# Patient Record
Sex: MALE | Race: WHITE
[De-identification: names, ages, dates, MRNs, and addresses within clinical notes are randomized per-mention and may not be internally consistent; named-entity substitution may affect disease eponyms.]

---

## 2018-12-12 ENCOUNTER — RX RENEWAL (OUTPATIENT)
Age: 48
End: 2018-12-12

## 2018-12-12 PROBLEM — Z00.00 ENCOUNTER FOR PREVENTIVE HEALTH EXAMINATION: Status: ACTIVE | Noted: 2018-12-12

## 2019-05-30 ENCOUNTER — CHART COPY (OUTPATIENT)
Age: 49
End: 2019-05-30

## 2019-06-25 ENCOUNTER — RX RENEWAL (OUTPATIENT)
Age: 49
End: 2019-06-25

## 2019-09-24 ENCOUNTER — RX RENEWAL (OUTPATIENT)
Age: 49
End: 2019-09-24

## 2019-10-10 ENCOUNTER — RX RENEWAL (OUTPATIENT)
Age: 49
End: 2019-10-10

## 2019-10-21 ENCOUNTER — APPOINTMENT (OUTPATIENT)
Dept: ENDOCRINOLOGY | Facility: CLINIC | Age: 49
End: 2019-10-21

## 2019-12-11 ENCOUNTER — RX RENEWAL (OUTPATIENT)
Age: 49
End: 2019-12-11

## 2020-02-18 ENCOUNTER — APPOINTMENT (OUTPATIENT)
Dept: ENDOCRINOLOGY | Facility: CLINIC | Age: 50
End: 2020-02-18
Payer: COMMERCIAL

## 2020-02-18 VITALS
DIASTOLIC BLOOD PRESSURE: 72 MMHG | HEIGHT: 72 IN | SYSTOLIC BLOOD PRESSURE: 110 MMHG | BODY MASS INDEX: 31.42 KG/M2 | WEIGHT: 232 LBS | HEART RATE: 55 BPM | OXYGEN SATURATION: 98 %

## 2020-02-18 PROCEDURE — 99214 OFFICE O/P EST MOD 30 MIN: CPT

## 2020-02-18 RX ORDER — CHROMIUM 200 MCG
TABLET ORAL
Refills: 0 | Status: ACTIVE | COMMUNITY

## 2020-02-18 NOTE — REVIEW OF SYSTEMS
[Recent Weight Loss (___ Lbs)] : recent [unfilled] ~Ulb weight loss [Dry Skin] : dry skin [Headache] : headaches [Fatigue] : no fatigue [Recent Weight Gain (___ Lbs)] : no recent weight gain [Decreased Appetite] : appetite not decreased [Visual Field Defect] : no visual field defect [Blurry Vision] : no blurred vision [Dysphonia] : no dysphonia [Dysphagia] : no dysphagia [Neck Pain] : no neck pain [Chest Pain] : no chest pain [Palpitations] : no palpitations [Hair Loss] : no hair loss [Tremors] : no tremors [Depression] : no depression [Anxiety] : no anxiety [Cold Intolerance] : cold tolerant [Heat Intolerance] : heat tolerant [Easy Bruising] : no tendency for easy bruising [Swelling] : no swelling [FreeTextEntry3] : need reading glasses  [de-identified] : yesterday

## 2020-02-18 NOTE — HISTORY OF PRESENT ILLNESS
[FreeTextEntry1] : Has not been seen since 2017. Pt. reports left upper arm numbness at times that comes and goes since the flu vaccine on 2/2/2020. \par \par Quality: Hypothyroidism \par Severity: moderate \par Duration: 7 years ago\par Onset: leg pain \par Modifying Factors: Better with LT4 \par Associated Symptoms: no neck pain, no dysphagia \par \par Notes:\par \par Current Regimen: Levothyroxine 125 mcg daily - taking appropriately\par \par \par Labs reviewed: No recent labs \par \par Date of last thyroid sonogram: 6/15/2017 - Heterogenous gland, Hyperechoic nodule with cystic center 2.6x1.5x1.5cm \par \par Prediabetes:  lifestyle modifications only\par \par On Vitamin D supplement \par

## 2020-02-18 NOTE — REASON FOR VISIT
[Follow-Up: _____] : a [unfilled] follow-up visit [FreeTextEntry1] : Hypothyroidism, Prediabetes, Vitamin D Deficiency, and Thyroid nodule

## 2020-02-18 NOTE — PHYSICAL EXAM
[Alert] : alert [No Acute Distress] : no acute distress [Well Developed] : well developed [Well Nourished] : well nourished [Normal Sclera/Conjunctiva] : normal sclera/conjunctiva [Supple] : the neck was supple [EOMI] : extra ocular movement intact [No LAD] : no lymphadenopathy [Normal Rate and Effort] : normal respiratory rhythm and effort [No Accessory Muscle Use] : no accessory muscle use [Normal Rate] : heart rate was normal  [Clear to Auscultation] : lungs were clear to auscultation bilaterally [Normal S1, S2] : normal S1 and S2 [No Stigmata of Cushings Syndrome] : no stigmata of cushings syndrome [Regular Rhythm] : with a regular rhythm [Normal Gait] : normal gait [No Rash] : no rash [No Motor Deficits] : the motor exam was normal [Oriented x3] : oriented to person, place, and time [No Tremors] : no tremors [Normal Mood] : the mood was normal [Normal Insight/Judgement] : insight and judgment were intact [Acanthosis Nigricans] : no acanthosis nigricans [de-identified] : enlarged right thyroid lobe

## 2020-02-18 NOTE — ASSESSMENT
[FreeTextEntry1] : 51 y/o male with Hypothyroidism, Prediabetes, Vitamin D Deficiency, and Thyroid nodule.\par \par Plan: \par Hypothyroidism: check TFTs now\par - continue LT4 125 mcg daily - may need to be adjusted based on results\par \par Prediabetes: check A1C now\par - continue lifestyle modifications\par \par Thyroid nodule: thyroid sonogram referral given \par \par Vitamin D Deficiency: check levels now, continue vitamin D supplement \par \par Follow up with PCP r/t left upper arm numbness at times s/p flu vaccine. \par \par Follow up visit in 6 months  [Levothyroxine] : The patient was instructed to take Levothyroxine on an empty stomach, separate from vitamins, and wait at least 30 minutes before eating

## 2020-02-24 LAB
25(OH)D3 SERPL-MCNC: 41.3 NG/ML
ALBUMIN SERPL ELPH-MCNC: 4.4 G/DL
ALP BLD-CCNC: 46 U/L
ALT SERPL-CCNC: 25 U/L
ANION GAP SERPL CALC-SCNC: 14 MMOL/L
AST SERPL-CCNC: 31 U/L
BILIRUB SERPL-MCNC: 0.4 MG/DL
BUN SERPL-MCNC: 15 MG/DL
CALCIUM SERPL-MCNC: 9.6 MG/DL
CHLORIDE SERPL-SCNC: 101 MMOL/L
CO2 SERPL-SCNC: 26 MMOL/L
CREAT SERPL-MCNC: 1.31 MG/DL
GLUCOSE SERPL-MCNC: 82 MG/DL
POTASSIUM SERPL-SCNC: 4.4 MMOL/L
PROT SERPL-MCNC: 6.9 G/DL
SODIUM SERPL-SCNC: 140 MMOL/L
T3FREE SERPL-MCNC: 3.03 PG/ML
T4 FREE SERPL-MCNC: 1.2 NG/DL
TSH SERPL-ACNC: 16.7 UIU/ML
VIT B12 SERPL-MCNC: 544 PG/ML

## 2020-03-12 ENCOUNTER — APPOINTMENT (OUTPATIENT)
Dept: ENDOCRINOLOGY | Facility: CLINIC | Age: 50
End: 2020-03-12
Payer: COMMERCIAL

## 2020-03-12 PROCEDURE — 76536 US EXAM OF HEAD AND NECK: CPT

## 2020-03-31 NOTE — IMPRESSION
[FreeTextEntry1] : Diffusely heterogeneous gland with stable complex 2.7 cm complex right lower pole nodule  without significant change since June 2017 study  [FreeTextEntry2] : repeat study in 1 year

## 2020-03-31 NOTE — PROCEDURE
[SwypeShield e 2008 model, 10-12 MHz frequencies] : multiple real time longitudinal and transverse images were obtained using a high resolution ultrasound with a linear transducer, SwypeShield e 2008 model, 10-12 MHz frequencies. All measurements will be reported as longitudinal x cedric-posterior x transverse. [Report dated ___] : Report dated [unfilled] [Thyroid Nodule] : thyroid nodule [] : a heterogeneous parenchyma [FreeTextEntry1] : 5.0 x 2.1 x 1.7 [FreeTextEntry5] : 5.1 x 1.9 x 1.0  [FreeTextEntry2] : 0.3 [FreeTextEntry4] : Right lower pole complex nodule  2.7 x 1.4 cx 1.4 cm

## 2020-05-18 ENCOUNTER — RX RENEWAL (OUTPATIENT)
Age: 50
End: 2020-05-18

## 2020-06-08 ENCOUNTER — RX RENEWAL (OUTPATIENT)
Age: 50
End: 2020-06-08

## 2020-07-10 ENCOUNTER — APPOINTMENT (OUTPATIENT)
Dept: ENDOCRINOLOGY | Facility: CLINIC | Age: 50
End: 2020-07-10
Payer: COMMERCIAL

## 2020-07-10 PROCEDURE — 36415 COLL VENOUS BLD VENIPUNCTURE: CPT

## 2020-07-13 LAB
T3FREE SERPL-MCNC: 3.38 PG/ML
T4 FREE SERPL-MCNC: 1.7 NG/DL
TSH SERPL-ACNC: 0.02 UIU/ML

## 2020-07-13 RX ORDER — LEVOTHYROXINE SODIUM 0.14 MG/1
137 TABLET ORAL
Qty: 96 | Refills: 0 | Status: DISCONTINUED | COMMUNITY
Start: 2018-12-12 | End: 2020-07-13

## 2020-08-11 ENCOUNTER — APPOINTMENT (OUTPATIENT)
Dept: ENDOCRINOLOGY | Facility: CLINIC | Age: 50
End: 2020-08-11

## 2020-08-18 ENCOUNTER — APPOINTMENT (OUTPATIENT)
Dept: ENDOCRINOLOGY | Facility: CLINIC | Age: 50
End: 2020-08-18

## 2020-09-04 ENCOUNTER — APPOINTMENT (OUTPATIENT)
Dept: ENDOCRINOLOGY | Facility: CLINIC | Age: 50
End: 2020-09-04
Payer: COMMERCIAL

## 2020-09-04 PROCEDURE — 99213 OFFICE O/P EST LOW 20 MIN: CPT | Mod: 95

## 2020-09-04 NOTE — REVIEW OF SYSTEMS
[Decreased Appetite] : appetite not decreased [Fatigue] : no fatigue [Recent Weight Loss (___ Lbs)] : recent weight loss: [unfilled] lbs [Visual Field Defect] : no visual field defect [Recent Weight Gain (___ Lbs)] : no recent weight gain [Blurred Vision] : blurred vision [Neck Pain] : no neck pain [Dysphagia] : no dysphagia [Chest Pain] : no chest pain [Dysphonia] : no dysphonia [Hair Loss] : no hair loss [Headaches] : no headaches [Dry Skin] : no dry skin [Palpitations] : no palpitations [Tremors] : no tremors [Cold Intolerance] : no cold intolerance [FreeTextEntry3] : blurry vision with reading  [Heat Intolerance] : no heat intolerance

## 2020-09-04 NOTE — HISTORY OF PRESENT ILLNESS
[Medical Office: ([facility])___] : at the medical office located in  [Other Location: e.g. School (Enter Location, City,State)___] : at [unfilled], at the time of the visit. [Verbal consent obtained from patient] : the patient, [unfilled] [FreeTextEntry1] : Quality: Hypothyroidism \par Severity: moderate \par Duration: 7 years ago\par Onset: leg pain \par Modifying Factors: Better with LT4 \par Associated Symptoms: no neck pain, no dysphagia \par \par Notes:\par \par Current Regimen: Levothyroxine 125 mcg daily - taking appropriately\par \par \par Labs reviewed: 7/13/20 TSH: 0.02, Free T3 3.3, Free T4 1.7\par \par Date of last thyroid sonogram: 6/15/2017 - Heterogenous gland, Hyperechoic nodule with cystic center 2.6x1.5x1.5cm \par \par Prediabetes:  lifestyle modifications only\par \par On Vitamin D supplement \par

## 2020-09-04 NOTE — ASSESSMENT
[FreeTextEntry1] : 49 y/o male with Hypothyroidism, Prediabetes, Vitamin D Deficiency, and Thyroid nodule.\par \par Plan: \par Hypothyroidism: check TFTs now\par - continue LT4 125 mcg daily - may need to be adjusted based on results\par \par Prediabetes: check A1C now\par - continue lifestyle modifications\par \par Thyroid nodule: thyroid nodules unchanged from previous - repeat in 1 year 3/2021\par \par Vitamin D Deficiency: check levels now, continue vitamin D supplement \par \par Follow up visit in 6 months \par \par Start Time: 3:06\par End Time: 3:21 [Levothyroxine] : The patient was instructed to take Levothyroxine on an empty stomach, separate from vitamins, and wait at least 30 minutes before eating

## 2020-10-11 ENCOUNTER — RX RENEWAL (OUTPATIENT)
Age: 50
End: 2020-10-11

## 2020-12-15 ENCOUNTER — RX RENEWAL (OUTPATIENT)
Age: 50
End: 2020-12-15

## 2021-01-29 ENCOUNTER — RX RENEWAL (OUTPATIENT)
Age: 51
End: 2021-01-29

## 2021-03-24 ENCOUNTER — RX RENEWAL (OUTPATIENT)
Age: 51
End: 2021-03-24

## 2021-04-12 ENCOUNTER — RX RENEWAL (OUTPATIENT)
Age: 51
End: 2021-04-12

## 2021-04-17 ENCOUNTER — APPOINTMENT (OUTPATIENT)
Age: 51
End: 2021-04-17

## 2021-05-07 ENCOUNTER — RX RENEWAL (OUTPATIENT)
Age: 51
End: 2021-05-07

## 2021-05-08 ENCOUNTER — APPOINTMENT (OUTPATIENT)
Age: 51
End: 2021-05-08
Payer: COMMERCIAL

## 2021-05-08 PROCEDURE — 0002A: CPT

## 2021-06-16 ENCOUNTER — RX RENEWAL (OUTPATIENT)
Age: 51
End: 2021-06-16

## 2021-06-18 ENCOUNTER — NON-APPOINTMENT (OUTPATIENT)
Age: 51
End: 2021-06-18

## 2021-08-09 ENCOUNTER — NON-APPOINTMENT (OUTPATIENT)
Age: 51
End: 2021-08-09

## 2021-08-17 ENCOUNTER — RX RENEWAL (OUTPATIENT)
Age: 51
End: 2021-08-17

## 2021-10-17 ENCOUNTER — RX RENEWAL (OUTPATIENT)
Age: 51
End: 2021-10-17

## 2021-10-28 LAB
HBA1C MFR BLD HPLC: 5.4
LDLC SERPL CALC-MCNC: 97
MICROALBUMIN/CREAT 24H UR-RTO: NORMAL

## 2021-10-29 ENCOUNTER — APPOINTMENT (OUTPATIENT)
Dept: ENDOCRINOLOGY | Facility: CLINIC | Age: 51
End: 2021-10-29
Payer: COMMERCIAL

## 2021-10-29 VITALS
HEIGHT: 72 IN | WEIGHT: 225 LBS | SYSTOLIC BLOOD PRESSURE: 110 MMHG | BODY MASS INDEX: 30.48 KG/M2 | DIASTOLIC BLOOD PRESSURE: 70 MMHG | HEART RATE: 70 BPM

## 2021-10-29 PROCEDURE — 99214 OFFICE O/P EST MOD 30 MIN: CPT

## 2021-10-29 RX ORDER — FOLIC ACID 1 MG/1
1 TABLET ORAL
Qty: 90 | Refills: 1 | Status: ACTIVE | COMMUNITY
Start: 2021-10-29 | End: 1900-01-01

## 2021-11-02 RX ORDER — METHOTREXATE 2.5 MG/1
2.5 TABLET ORAL
Refills: 0 | Status: ACTIVE | COMMUNITY

## 2021-11-02 NOTE — ASSESSMENT
[Levothyroxine] : The patient was instructed to take Levothyroxine on an empty stomach, separate from vitamins, and wait at least 30 minutes before eating [FreeTextEntry1] : 51 y/o male with Hypothyroidism, Prediabetes, Vitamin D Deficiency, and Thyroid nodule.\par \par Plan: \par Hypothyroidism: check TFTs now\par - continue LT4 125 mcg daily - may need to be adjusted based on results\par \par Prediabetes: check A1C now\par - continue lifestyle modifications\par \par Thyroid nodule: thyroid nodules unchanged from previous - repeat in 1 year 3/2021\par \par Vitamin D Deficiency: check levels now, continue vitamin D supplement \par \par Follow up visit in 6 months \par \par Start Time: 3:06\par End Time: 3:21

## 2021-11-02 NOTE — REVIEW OF SYSTEMS
[Recent Weight Loss (___ Lbs)] : recent weight loss: [unfilled] lbs [Blurred Vision] : blurred vision [Fatigue] : no fatigue [Decreased Appetite] : appetite not decreased [Recent Weight Gain (___ Lbs)] : no recent weight gain [Visual Field Defect] : no visual field defect [Dysphagia] : no dysphagia [Neck Pain] : no neck pain [Dysphonia] : no dysphonia [Chest Pain] : no chest pain [Palpitations] : no palpitations [Dry Skin] : no dry skin [Hair Loss] : no hair loss [Headaches] : no headaches [Tremors] : no tremors [Cold Intolerance] : no cold intolerance [Heat Intolerance] : no heat intolerance [FreeTextEntry3] : blurry vision with reading

## 2021-11-02 NOTE — HISTORY OF PRESENT ILLNESS
[Other Location: e.g. School (Enter Location, City,State)___] : at [unfilled], at the time of the visit. [Medical Office: ([facility])___] : at the medical office located in  [Verbal consent obtained from patient] : the patient, [unfilled] [FreeTextEntry1] : Quality: Hypothyroidism \par Severity: moderate \par Duration: 7 years ago\par Onset: leg pain \par Modifying Factors: Better with LT4 \par Associated Symptoms: no neck pain, no dysphagia \par \par Notes:\par \par Current Regimen: Levothyroxine 125 mcg daily - taking appropriately\par \par \par Labs reviewed: 7/13/20 TSH: 0.02, Free T3 3.3, Free T4 1.7\par \par Date of last thyroid sonogram: 6/15/2017 - Heterogenous gland, Hyperechoic nodule with cystic center 2.6x1.5x1.5cm \par \par Prediabetes:  lifestyle modifications only\par \par On Vitamin D supplement \par \par dx with SLE   on MTX  \par  6 tab od 2.5 mg \par \par

## 2022-04-28 ENCOUNTER — APPOINTMENT (OUTPATIENT)
Dept: ENDOCRINOLOGY | Facility: CLINIC | Age: 52
End: 2022-04-28

## 2022-10-06 ENCOUNTER — APPOINTMENT (OUTPATIENT)
Dept: ENDOCRINOLOGY | Facility: CLINIC | Age: 52
End: 2022-10-06

## 2022-10-06 PROCEDURE — 76536 US EXAM OF HEAD AND NECK: CPT

## 2022-10-24 NOTE — IMPRESSION
[FreeTextEntry1] : Diffusely heterogeneous gland with stable complex 2.6 cm complex right lower pole nodule  without significant change since MArch 2020  study  [FreeTextEntry2] : repeat study in 1 year

## 2022-10-24 NOTE — PROCEDURE
[Pet[employer]rosia e 2008 model, 10-12 MHz frequencies] : multiple real time longitudinal and transverse images were obtained using a high resolution ultrasound with a linear transducer, Petbrosia e 2008 model, 10-12 MHz frequencies. All measurements will be reported as longitudinal x cedric-posterior x transverse. [Report dated ___] : Report dated [unfilled] [Thyroid Nodule] : thyroid nodule [FreeTextEntry1] : 4.6 x1.7 x1.9  [FreeTextEntry5] : 3.3x 1.3 x 1.1 [FreeTextEntry4] : Right lower pole complex nodule  2.6 x 1.6 cm x  1.4 cm  [FreeTextEntry2] : 0.5

## 2022-12-20 ENCOUNTER — APPOINTMENT (OUTPATIENT)
Dept: ENDOCRINOLOGY | Facility: CLINIC | Age: 52
End: 2022-12-20

## 2023-02-13 ENCOUNTER — RX RENEWAL (OUTPATIENT)
Age: 53
End: 2023-02-13

## 2023-02-17 ENCOUNTER — RX RENEWAL (OUTPATIENT)
Age: 53
End: 2023-02-17

## 2023-03-02 ENCOUNTER — APPOINTMENT (OUTPATIENT)
Dept: ENDOCRINOLOGY | Facility: CLINIC | Age: 53
End: 2023-03-02
Payer: COMMERCIAL

## 2023-03-02 VITALS
BODY MASS INDEX: 33.18 KG/M2 | WEIGHT: 245 LBS | RESPIRATION RATE: 14 BRPM | HEART RATE: 80 BPM | SYSTOLIC BLOOD PRESSURE: 100 MMHG | OXYGEN SATURATION: 98 % | HEIGHT: 72 IN | DIASTOLIC BLOOD PRESSURE: 80 MMHG

## 2023-03-02 PROCEDURE — 99214 OFFICE O/P EST MOD 30 MIN: CPT

## 2023-03-02 NOTE — HISTORY OF PRESENT ILLNESS
[FreeTextEntry1] : Quality: Hypothyroidism \par Severity: moderate \par Duration: 7 years ago\par Onset: leg pain \par Modifying Factors: Better with LT4 \par Associated Symptoms: no neck pain, no dysphagia \par \par Notes:\par \par Current Regimen: Levothyroxine 125 mcg daily - taking appropriately\par \par \par Labs reviewed:\par Date of last thyroid sonogram: 10/2022 - Heterogenous gland, Hyperechoic nodule with cystic center 2.6x1.5x1.5cm \par \par Prediabetes:  lifestyle modifications only\par \par On Vitamin D supplement \par dx spondylarthorpathy   wnet off MTX  due to see Rhem\par \par \par a fw weeksa go  nsoebleedin diner \par ? vasovagalrxn- felt woozy and broek into sweat  did not faint \par

## 2023-03-02 NOTE — ASSESSMENT
[Levothyroxine] : The patient was instructed to take Levothyroxine on an empty stomach, separate from vitamins, and wait at least 30 minutes before eating [FreeTextEntry1] : 52 y/o male with Hypothyroidism, Prediabetes, Vitamin D Deficiency, and Thyroid nodule.\par \par Plan: \par Hypothyroidism: check TFTs now\par - continue LT4 125 mcg daily - may need to be adjusted based on results\par \par Prediabetes: check A1C now\par - continue lifestyle modifications\par \par Thyroid nodule: thyroid nodules unchanged from previous - repeat in 1Oc2023\par \par Vitamin D Deficiency: check levels now, continue vitamin D supplement \par \par  nosebleed-0 see ENt some nasla congestion\par \par  Spndylarthropahty see Rhem\par \par  follow up with heme  inc ferritin

## 2023-09-07 ENCOUNTER — APPOINTMENT (OUTPATIENT)
Dept: ENDOCRINOLOGY | Facility: CLINIC | Age: 53
End: 2023-09-07
Payer: COMMERCIAL

## 2023-09-07 ENCOUNTER — RX RENEWAL (OUTPATIENT)
Age: 53
End: 2023-09-07

## 2023-09-07 VITALS
DIASTOLIC BLOOD PRESSURE: 74 MMHG | HEART RATE: 57 BPM | BODY MASS INDEX: 32.64 KG/M2 | HEIGHT: 72 IN | WEIGHT: 241 LBS | OXYGEN SATURATION: 97 % | SYSTOLIC BLOOD PRESSURE: 110 MMHG

## 2023-09-07 DIAGNOSIS — R73.03 PREDIABETES.: ICD-10-CM

## 2023-09-07 DIAGNOSIS — E53.8 DEFICIENCY OF OTHER SPECIFIED B GROUP VITAMINS: ICD-10-CM

## 2023-09-07 DIAGNOSIS — E55.9 VITAMIN D DEFICIENCY, UNSPECIFIED: ICD-10-CM

## 2023-09-07 DIAGNOSIS — E04.1 NONTOXIC SINGLE THYROID NODULE: ICD-10-CM

## 2023-09-07 DIAGNOSIS — E03.8 OTHER SPECIFIED HYPOTHYROIDISM: ICD-10-CM

## 2023-09-07 PROCEDURE — 76536 US EXAM OF HEAD AND NECK: CPT

## 2023-09-07 PROCEDURE — 99215 OFFICE O/P EST HI 40 MIN: CPT

## 2023-09-07 PROCEDURE — 36415 COLL VENOUS BLD VENIPUNCTURE: CPT

## 2023-09-07 NOTE — PHYSICAL EXAM
[Alert] : alert [Well Nourished] : well nourished [No Acute Distress] : no acute distress [Well Developed] : well developed [Normal Sclera/Conjunctiva] : normal sclera/conjunctiva [EOMI] : extra ocular movement intact [No Proptosis] : no proptosis [Normal Oropharynx] : the oropharynx was normal [Thyroid Not Enlarged] : the thyroid was not enlarged [No Respiratory Distress] : no respiratory distress [No Accessory Muscle Use] : no accessory muscle use [Clear to Auscultation] : lungs were clear to auscultation bilaterally [Normal S1, S2] : normal S1 and S2 [Normal Rate] : heart rate was normal [Regular Rhythm] : with a regular rhythm [Normal Bowel Sounds] : normal bowel sounds [Not Tender] : non-tender [Not Distended] : not distended [Soft] : abdomen soft [No Rash] : no rash [Normal Reflexes] : deep tendon reflexes were 2+ and symmetric [No Tremors] : no tremors [Oriented x3] : oriented to person, place, and time [Acanthosis Nigricans] : no acanthosis nigricans [de-identified] : palpable R nodule

## 2023-09-07 NOTE — HISTORY OF PRESENT ILLNESS
[FreeTextEntry1] : here for hypothyroidism, prediabetes, mng, vit d def  hypothyroidism dx 2015   interval history saw dr. waters in past  chart reviewed had labs done today had nodule biopsied about 10 years   taking LT4 125mcg daily, occ missing doses

## 2023-09-07 NOTE — ASSESSMENT
[FreeTextEntry1] : 53M w/ spondylarthropathy, hypothyroidism, R thyroid nodule, prediabetes and vit d deficiency here for follow up rtc in 1 year with labs and in office sono might be moving to [place] in 8 years  Hypothyroidism- need labs. no symptoms. continue LT4 125mcg  Thyroid nodule- in setting of hashimoto's gland. RMP isoechoic nodule 2.6cm.sono, now mostly solid. previously when nodule was biopsied it likely had more cystic component. 2010 RMP 3.7cm s/p benign biopsy. does not need rebiopsy unless it has changed in size/appearance or he has symptoms. discussed with the patient. repeat sono annually  Prediabetes- work on diet and exercise. check a1c annually  Vit D deficiency- continue vit d supplement

## 2023-09-11 PROBLEM — E04.1 THYROID NODULE: Status: ACTIVE | Noted: 2020-02-18

## 2023-10-08 LAB
25(OH)D3 SERPL-MCNC: 36.3 NG/ML
ALBUMIN SERPL ELPH-MCNC: 4.7 G/DL
ALP BLD-CCNC: 47 U/L
ALT SERPL-CCNC: 20 U/L
ANION GAP SERPL CALC-SCNC: 10 MMOL/L
AST SERPL-CCNC: 18 U/L
BILIRUB SERPL-MCNC: 0.4 MG/DL
BUN SERPL-MCNC: 16 MG/DL
CALCIUM SERPL-MCNC: 10 MG/DL
CHLORIDE SERPL-SCNC: 104 MMOL/L
CHOLEST SERPL-MCNC: 219 MG/DL
CO2 SERPL-SCNC: 27 MMOL/L
CREAT SERPL-MCNC: 1.28 MG/DL
CREAT SPEC-SCNC: 160 MG/DL
EGFR: 67 ML/MIN/1.73M2
ESTIMATED AVERAGE GLUCOSE: 117 MG/DL
FERRITIN SERPL-MCNC: 335 NG/ML
GLUCOSE SERPL-MCNC: 93 MG/DL
HBA1C MFR BLD HPLC: 5.7 %
HDLC SERPL-MCNC: 64 MG/DL
IRON SATN MFR SERPL: 32 %
IRON SERPL-MCNC: 114 UG/DL
LDLC SERPL CALC-MCNC: 137 MG/DL
MICROALBUMIN 24H UR DL<=1MG/L-MCNC: <1.2 MG/DL
MICROALBUMIN/CREAT 24H UR-RTO: NORMAL MG/G
NONHDLC SERPL-MCNC: 155 MG/DL
POTASSIUM SERPL-SCNC: 4.9 MMOL/L
PROT SERPL-MCNC: 7.1 G/DL
SODIUM SERPL-SCNC: 141 MMOL/L
T3FREE SERPL-MCNC: 3.37 PG/ML
T4 FREE SERPL-MCNC: 1.5 NG/DL
TIBC SERPL-MCNC: 358 UG/DL
TRIGL SERPL-MCNC: 102 MG/DL
TSH SERPL-ACNC: 1.81 UIU/ML
UIBC SERPL-MCNC: 244 UG/DL
VIT B12 SERPL-MCNC: 538 PG/ML

## 2024-03-15 ENCOUNTER — RX RENEWAL (OUTPATIENT)
Age: 54
End: 2024-03-15

## 2024-06-20 ENCOUNTER — RX RENEWAL (OUTPATIENT)
Age: 54
End: 2024-06-20

## 2024-06-20 RX ORDER — LEVOTHYROXINE SODIUM 0.12 MG/1
125 TABLET ORAL
Qty: 90 | Refills: 0 | Status: ACTIVE | COMMUNITY
Start: 2020-06-08 | End: 1900-01-01

## 2024-08-29 ENCOUNTER — APPOINTMENT (OUTPATIENT)
Dept: ENDOCRINOLOGY | Facility: CLINIC | Age: 54
End: 2024-08-29

## 2024-08-29 DIAGNOSIS — E04.1 NONTOXIC SINGLE THYROID NODULE: ICD-10-CM

## 2024-08-29 PROCEDURE — 76536 US EXAM OF HEAD AND NECK: CPT

## 2024-09-07 NOTE — PROCEDURE
[[employer] e 2008 model, 10-12 MHz frequencies] : multiple real time longitudinal and transverse images were obtained using a high resolution ultrasound with a linear transducer, [employer] e 2008 model, 10-12 MHz frequencies. All measurements will be reported as longitudinal x cedric-posterior x transverse. [Report dated ___] : Report dated [unfilled] [Thyroid Nodule] : thyroid nodule [] : a heterogeneous parenchyma [Predominantly Solid] : predominantly solid [Isoechoic solid component] : with isoechoic solid component [Macrocalcifications] : macrocalcifications [Peripheral vascularity] : peripheral vascularity [Right Thyroid] : right [Comet trail artifact] : comet trail artifact [Mid] : mid pole there is a  [Left Thyroid] : left [Lower] : lower pole there is a  [Mixed] : mixed [Hypoechoic solid component] : with hypoechoic solid component [Round] : round in shape [Isthmus] : isthmus there is a  [Solid] : solid [Hypoechoic] : hypoechoic nodule [Spongiform Appearance] : spongiform appearance [Ovoid] : ovoid in shape [Smooth] : smooth [Regular] : regular [No calcifications] : no calcifications [FreeTextEntry1] : 3.8x1.6x1.7 [FreeTextEntry5] : 3.9x0.9x1.2 [FreeTextEntry2] : 0.4 [FreeTextEntry4] : LLP nodule with more of a cystic component (previously did not have a cystic component). [FreeTextEntry3] : 0.7x0.5x0.4

## 2024-09-07 NOTE — IMPRESSION
[FreeTextEntry1] : Heterogeneous thyroid Single large thyroid nodule with previous benign biopsy, stable in size [FreeTextEntry2] : Repeat sonogram in 1 year

## 2024-09-12 ENCOUNTER — APPOINTMENT (OUTPATIENT)
Dept: ENDOCRINOLOGY | Facility: CLINIC | Age: 54
End: 2024-09-12
Payer: COMMERCIAL

## 2024-09-12 VITALS
WEIGHT: 238 LBS | DIASTOLIC BLOOD PRESSURE: 70 MMHG | HEART RATE: 67 BPM | SYSTOLIC BLOOD PRESSURE: 110 MMHG | OXYGEN SATURATION: 97 % | BODY MASS INDEX: 32.23 KG/M2 | HEIGHT: 72 IN

## 2024-09-12 DIAGNOSIS — E53.8 DEFICIENCY OF OTHER SPECIFIED B GROUP VITAMINS: ICD-10-CM

## 2024-09-12 DIAGNOSIS — R73.03 PREDIABETES.: ICD-10-CM

## 2024-09-12 DIAGNOSIS — E04.1 NONTOXIC SINGLE THYROID NODULE: ICD-10-CM

## 2024-09-12 DIAGNOSIS — E55.9 VITAMIN D DEFICIENCY, UNSPECIFIED: ICD-10-CM

## 2024-09-12 DIAGNOSIS — E03.8 OTHER SPECIFIED HYPOTHYROIDISM: ICD-10-CM

## 2024-09-12 PROCEDURE — 99214 OFFICE O/P EST MOD 30 MIN: CPT

## 2024-09-12 PROCEDURE — 36415 COLL VENOUS BLD VENIPUNCTURE: CPT

## 2024-09-12 NOTE — HISTORY OF PRESENT ILLNESS
[FreeTextEntry1] : here for hypothyroidism, prediabetes, mng, vit d def had nodule biopsied about 10 years ago  hypothyroidism dx 2015   interval history here for annual visit chart reviewed no labs done wife works as a teacher. has 3 boys works as construction  had labs done today areli in office reviewed- stable nodules  taking LT4 125mcg daily, occ missing doses

## 2024-09-12 NOTE — PHYSICAL EXAM
[Alert] : alert [Well Nourished] : well nourished [No Acute Distress] : no acute distress [Well Developed] : well developed [Normal Sclera/Conjunctiva] : normal sclera/conjunctiva [EOMI] : extra ocular movement intact [No Proptosis] : no proptosis [Normal Oropharynx] : the oropharynx was normal [Thyroid Not Enlarged] : the thyroid was not enlarged [No Respiratory Distress] : no respiratory distress [No Accessory Muscle Use] : no accessory muscle use [Clear to Auscultation] : lungs were clear to auscultation bilaterally [Normal S1, S2] : normal S1 and S2 [Normal Rate] : heart rate was normal [Regular Rhythm] : with a regular rhythm [Normal Bowel Sounds] : normal bowel sounds [Not Tender] : non-tender [Not Distended] : not distended [Soft] : abdomen soft [No Rash] : no rash [Normal Reflexes] : deep tendon reflexes were 2+ and symmetric [No Tremors] : no tremors [Oriented x3] : oriented to person, place, and time [Acanthosis Nigricans] : no acanthosis nigricans [de-identified] : palpable R nodule

## 2024-09-12 NOTE — ASSESSMENT
[FreeTextEntry1] : 54M w/ spondyloarthropathy with chronic arthritis, hypothyroidism, R thyroid nodule, prediabetes and vit d deficiency here for follow up rtc in 1 year with labs and sono labs done in the office today might be moving to [place] in 5 years  Hypothyroidism- need labs. no symptoms. continue LT4 125mcg  Thyroid nodule- in setting of hashimoto's gland. RMP isoechoic nodule 2.6cm.sono, now mostly solid. previously when nodule was biopsied it likely had more cystic component. 2010 RMP 3.7cm s/p benign biopsy. does not need rebiopsy unless it has changed in size/appearance or he has symptoms. discussed with the patient.  2024 sono with stable nodule repeat sono annually  Prediabetes- work on diet and exercise. check a1c annually  Vit D deficiency- continue vit d supplement

## 2024-09-13 LAB
25(OH)D3 SERPL-MCNC: 40.5 NG/ML
ALBUMIN SERPL ELPH-MCNC: 4.7 G/DL
ALP BLD-CCNC: 53 U/L
ALT SERPL-CCNC: 23 U/L
ANION GAP SERPL CALC-SCNC: 10 MMOL/L
AST SERPL-CCNC: 22 U/L
BASOPHILS # BLD AUTO: 0.08 K/UL
BASOPHILS NFR BLD AUTO: 1.3 %
BILIRUB SERPL-MCNC: 0.4 MG/DL
BUN SERPL-MCNC: 14 MG/DL
CALCIUM SERPL-MCNC: 10.1 MG/DL
CHLORIDE SERPL-SCNC: 102 MMOL/L
CHOLEST SERPL-MCNC: 227 MG/DL
CO2 SERPL-SCNC: 27 MMOL/L
CREAT SERPL-MCNC: 1.02 MG/DL
EGFR: 87 ML/MIN/1.73M2
EOSINOPHIL # BLD AUTO: 0.19 K/UL
EOSINOPHIL NFR BLD AUTO: 3 %
ESTIMATED AVERAGE GLUCOSE: 114 MG/DL
GLUCOSE SERPL-MCNC: 89 MG/DL
HBA1C MFR BLD HPLC: 5.6 %
HCT VFR BLD CALC: 47.1 %
HDLC SERPL-MCNC: 55 MG/DL
HGB BLD-MCNC: 15.7 G/DL
IMM GRANULOCYTES NFR BLD AUTO: 0.3 %
LDLC SERPL CALC-MCNC: 143 MG/DL
LYMPHOCYTES # BLD AUTO: 2.07 K/UL
LYMPHOCYTES NFR BLD AUTO: 32.4 %
MAN DIFF?: NORMAL
MCHC RBC-ENTMCNC: 31.4 PG
MCHC RBC-ENTMCNC: 33.3 GM/DL
MCV RBC AUTO: 94.2 FL
MONOCYTES # BLD AUTO: 0.66 K/UL
MONOCYTES NFR BLD AUTO: 10.3 %
NEUTROPHILS # BLD AUTO: 3.37 K/UL
NEUTROPHILS NFR BLD AUTO: 52.7 %
NONHDLC SERPL-MCNC: 172 MG/DL
PLATELET # BLD AUTO: 277 K/UL
POTASSIUM SERPL-SCNC: 4.5 MMOL/L
PROT SERPL-MCNC: 7.2 G/DL
RBC # BLD: 5 M/UL
RBC # FLD: 13.2 %
SODIUM SERPL-SCNC: 138 MMOL/L
T4 FREE SERPL-MCNC: 1.6 NG/DL
TRIGL SERPL-MCNC: 161 MG/DL
TSH SERPL-ACNC: 0.83 UIU/ML
VIT B12 SERPL-MCNC: 513 PG/ML
WBC # FLD AUTO: 6.39 K/UL

## 2025-03-27 ENCOUNTER — APPOINTMENT (OUTPATIENT)
Dept: ENDOCRINOLOGY | Facility: CLINIC | Age: 55
End: 2025-03-27
Payer: COMMERCIAL

## 2025-03-27 VITALS
BODY MASS INDEX: 31.83 KG/M2 | DIASTOLIC BLOOD PRESSURE: 70 MMHG | HEIGHT: 72 IN | OXYGEN SATURATION: 97 % | WEIGHT: 235 LBS | SYSTOLIC BLOOD PRESSURE: 112 MMHG | HEART RATE: 63 BPM

## 2025-03-27 DIAGNOSIS — R73.03 PREDIABETES.: ICD-10-CM

## 2025-03-27 DIAGNOSIS — M54.9 DORSALGIA, UNSPECIFIED: ICD-10-CM

## 2025-03-27 DIAGNOSIS — E55.9 VITAMIN D DEFICIENCY, UNSPECIFIED: ICD-10-CM

## 2025-03-27 DIAGNOSIS — E04.1 NONTOXIC SINGLE THYROID NODULE: ICD-10-CM

## 2025-03-27 DIAGNOSIS — E03.8 OTHER SPECIFIED HYPOTHYROIDISM: ICD-10-CM

## 2025-03-27 PROCEDURE — 99215 OFFICE O/P EST HI 40 MIN: CPT

## 2025-03-27 PROCEDURE — G0447 BEHAVIOR COUNSEL OBESITY 15M: CPT | Mod: 59

## 2025-03-27 PROCEDURE — G2211 COMPLEX E/M VISIT ADD ON: CPT | Mod: NC

## 2025-03-27 RX ORDER — SEMAGLUTIDE 0.25 MG/.5ML
0.25 INJECTION, SOLUTION SUBCUTANEOUS
Qty: 1 | Refills: 0 | Status: ACTIVE | COMMUNITY
Start: 2025-03-27

## 2025-03-28 LAB
25(OH)D3 SERPL-MCNC: 29.5 NG/ML
ALBUMIN SERPL ELPH-MCNC: 4.7 G/DL
ALP BLD-CCNC: 60 U/L
ALT SERPL-CCNC: 30 U/L
ANION GAP SERPL CALC-SCNC: 12 MMOL/L
AST SERPL-CCNC: 22 U/L
BILIRUB SERPL-MCNC: 0.3 MG/DL
BUN SERPL-MCNC: 15 MG/DL
CALCIUM SERPL-MCNC: 9.6 MG/DL
CHLORIDE SERPL-SCNC: 102 MMOL/L
CHOLEST SERPL-MCNC: 252 MG/DL
CO2 SERPL-SCNC: 26 MMOL/L
CREAT SERPL-MCNC: 1.21 MG/DL
EGFRCR SERPLBLD CKD-EPI 2021: 70 ML/MIN/1.73M2
ESTIMATED AVERAGE GLUCOSE: 117 MG/DL
GLUCOSE SERPL-MCNC: 95 MG/DL
HBA1C MFR BLD HPLC: 5.7 %
HDLC SERPL-MCNC: 59 MG/DL
LDLC SERPL-MCNC: 176 MG/DL
NONHDLC SERPL-MCNC: 193 MG/DL
POTASSIUM SERPL-SCNC: 4.5 MMOL/L
PROT SERPL-MCNC: 7.4 G/DL
SODIUM SERPL-SCNC: 139 MMOL/L
T4 FREE SERPL-MCNC: 0.9 NG/DL
TRIGL SERPL-MCNC: 101 MG/DL
TSH SERPL-ACNC: 11.9 UIU/ML

## 2025-04-01 ENCOUNTER — APPOINTMENT (OUTPATIENT)
Dept: CARDIOLOGY | Facility: CLINIC | Age: 55
End: 2025-04-01
Payer: COMMERCIAL

## 2025-04-01 VITALS
HEIGHT: 72 IN | WEIGHT: 235 LBS | OXYGEN SATURATION: 97 % | DIASTOLIC BLOOD PRESSURE: 80 MMHG | BODY MASS INDEX: 31.83 KG/M2 | HEART RATE: 71 BPM | SYSTOLIC BLOOD PRESSURE: 130 MMHG

## 2025-04-01 DIAGNOSIS — Z78.9 OTHER SPECIFIED HEALTH STATUS: ICD-10-CM

## 2025-04-01 DIAGNOSIS — E66.9 OBESITY, UNSPECIFIED: ICD-10-CM

## 2025-04-01 DIAGNOSIS — Z00.00 ENCOUNTER FOR GENERAL ADULT MEDICAL EXAMINATION W/OUT ABNORMAL FINDINGS: ICD-10-CM

## 2025-04-01 DIAGNOSIS — Z87.891 PERSONAL HISTORY OF NICOTINE DEPENDENCE: ICD-10-CM

## 2025-04-01 DIAGNOSIS — Z86.19 PERSONAL HISTORY OF OTHER INFECTIOUS AND PARASITIC DISEASES: ICD-10-CM

## 2025-04-01 DIAGNOSIS — R06.02 SHORTNESS OF BREATH: ICD-10-CM

## 2025-04-01 DIAGNOSIS — M47.819 SPONDYLOSIS W/OUT MYELOPATHY OR RADICULOPATHY, SITE UNSPECIFIED: ICD-10-CM

## 2025-04-01 DIAGNOSIS — E78.5 HYPERLIPIDEMIA, UNSPECIFIED: ICD-10-CM

## 2025-04-01 DIAGNOSIS — R53.83 OTHER FATIGUE: ICD-10-CM

## 2025-04-01 PROCEDURE — 99203 OFFICE O/P NEW LOW 30 MIN: CPT

## 2025-04-01 PROCEDURE — 93000 ELECTROCARDIOGRAM COMPLETE: CPT

## 2025-04-01 RX ORDER — ROSUVASTATIN CALCIUM 10 MG/1
10 TABLET, FILM COATED ORAL
Qty: 90 | Refills: 3 | Status: ACTIVE | COMMUNITY
Start: 2025-04-01 | End: 1900-01-01

## 2025-04-24 ENCOUNTER — NON-APPOINTMENT (OUTPATIENT)
Age: 55
End: 2025-04-24

## 2025-06-09 ENCOUNTER — APPOINTMENT (OUTPATIENT)
Dept: CARDIOLOGY | Facility: CLINIC | Age: 55
End: 2025-06-09
Payer: COMMERCIAL

## 2025-06-09 VITALS
DIASTOLIC BLOOD PRESSURE: 80 MMHG | BODY MASS INDEX: 31.83 KG/M2 | WEIGHT: 235 LBS | SYSTOLIC BLOOD PRESSURE: 128 MMHG | HEART RATE: 69 BPM | HEIGHT: 72 IN | OXYGEN SATURATION: 97 %

## 2025-06-09 PROCEDURE — 99213 OFFICE O/P EST LOW 20 MIN: CPT

## 2025-06-20 LAB
LDLC SERPL DIRECT ASSAY-MCNC: 72
TSH SERPL-ACNC: 2.4

## 2025-06-23 ENCOUNTER — NON-APPOINTMENT (OUTPATIENT)
Age: 55
End: 2025-06-23

## 2025-06-23 ENCOUNTER — APPOINTMENT (OUTPATIENT)
Dept: ENDOCRINOLOGY | Facility: CLINIC | Age: 55
End: 2025-06-23
Payer: COMMERCIAL

## 2025-06-23 VITALS
BODY MASS INDEX: 31.15 KG/M2 | HEIGHT: 72 IN | WEIGHT: 230 LBS | OXYGEN SATURATION: 94 % | SYSTOLIC BLOOD PRESSURE: 126 MMHG | DIASTOLIC BLOOD PRESSURE: 70 MMHG | HEART RATE: 88 BPM

## 2025-06-23 PROCEDURE — 99214 OFFICE O/P EST MOD 30 MIN: CPT

## 2025-06-23 PROCEDURE — G2211 COMPLEX E/M VISIT ADD ON: CPT | Mod: NC

## 2025-09-10 ENCOUNTER — APPOINTMENT (OUTPATIENT)
Dept: ENDOCRINOLOGY | Facility: CLINIC | Age: 55
End: 2025-09-10

## 2025-09-18 ENCOUNTER — NON-APPOINTMENT (OUTPATIENT)
Age: 55
End: 2025-09-18

## 2025-09-18 LAB
HBA1C MFR BLD HPLC: 5.5
LDLC SERPL DIRECT ASSAY-MCNC: 144
TSH SERPL-ACNC: 0.84

## 2025-09-19 ENCOUNTER — APPOINTMENT (OUTPATIENT)
Dept: ENDOCRINOLOGY | Facility: CLINIC | Age: 55
End: 2025-09-19

## 2025-09-19 VITALS
BODY MASS INDEX: 30.75 KG/M2 | WEIGHT: 227 LBS | HEART RATE: 70 BPM | HEIGHT: 72 IN | DIASTOLIC BLOOD PRESSURE: 78 MMHG | SYSTOLIC BLOOD PRESSURE: 104 MMHG | OXYGEN SATURATION: 97 %

## 2025-09-19 DIAGNOSIS — E03.8 OTHER SPECIFIED HYPOTHYROIDISM: ICD-10-CM

## 2025-09-19 DIAGNOSIS — E78.5 HYPERLIPIDEMIA, UNSPECIFIED: ICD-10-CM

## 2025-09-19 DIAGNOSIS — R73.03 PREDIABETES.: ICD-10-CM

## 2025-09-19 DIAGNOSIS — E55.9 VITAMIN D DEFICIENCY, UNSPECIFIED: ICD-10-CM

## 2025-09-19 DIAGNOSIS — E83.52 HYPERCALCEMIA: ICD-10-CM

## 2025-09-19 DIAGNOSIS — E04.1 NONTOXIC SINGLE THYROID NODULE: ICD-10-CM

## 2025-09-19 DIAGNOSIS — E66.9 OBESITY, UNSPECIFIED: ICD-10-CM
